# Patient Record
Sex: FEMALE | Race: BLACK OR AFRICAN AMERICAN | NOT HISPANIC OR LATINO | Employment: OTHER | URBAN - METROPOLITAN AREA
[De-identification: names, ages, dates, MRNs, and addresses within clinical notes are randomized per-mention and may not be internally consistent; named-entity substitution may affect disease eponyms.]

---
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
## 2022-04-11 ENCOUNTER — HISTORICAL (OUTPATIENT)
Dept: ADMINISTRATIVE | Facility: HOSPITAL | Age: 22
End: 2022-04-11

## 2022-04-28 VITALS — BODY MASS INDEX: 35.27 KG/M2 | HEIGHT: 69 IN | WEIGHT: 238.13 LBS

## 2022-04-30 NOTE — OP NOTE
Patient:   Magnus Caal             MRN: [id number]            FIN: [id number]               Age:   16 years     Sex:  Female     :  2000   Associated Diagnoses:   None   Author:   Suman Valera MD      OPERATIVE REPORT    DATE: 2017    SURGEON: Suman Valera MD    ASSISTANT: Alisson Dodge    PREOPERATIVE DIAGNOSIS:  1.  Right knee anterior cruciate ligament rupture    POSTOPERATIVE DIAGNOSIS:  Same    PROCEDURE:  1.  Right knee anterior cruciate ligament reconstruction with hamstring autograft (all inside single tendon)    TYPE OF ANESTHESIA:  General anesthesia with femoral nerve block    BLOOD LOSS:  Less than 20 cc    DVT PROPHYLAXIS:  This patient replaced on aspirin postoperatively for 10 days.    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  She sustained his ACL tear and wanted to proceed with surgery.    PROCEDURE IN DETAIL:      Diagnostic knee arthroscopy  The patient was carefully place in a supine position. The operative lower extremity was placed in the padded leg luis. The non-operative lower extremity was placed in padded leg rest. The operative site was prepped and draped in the normal sterile fashion. A time out was performed to confirm correct operative extremity, allergies, and antibiotic infusion.   The knee joint was infused with 60cc of isotonic fluid. The supero-medial inflow portal is established. The luan-medial and luan-lateral portals are established. All portal sites established with 11-blade.  Through the luan-lateral portal, we then sequentially visualized these areas of the knee for any pathology: medial comparment, postero-medial compartment, lateral compartment, postero-lateral compartment, intercondylar compartment, suprapatellar compartment, medial gutter, and lateral gutter.   The certified assistant provided  critical assistance by holding instruments including the arthroscope to provide adequate visualization of the procedure, as well as assisting in wound closure.  At the end of the procedure the knee was injected with 60cc of 0.50% Marcaine. The portals were closed with mastisol around the wound and placement of steri-strips. The patient tolerated the procedure well and taken to the recovery room in good condition.      ACL autograft reconstruction with Hamstring autograft (all-inside single hamstring tendon)  At the beginning of the case a 3cm incision was made just medial to the tibial tubercle.  After dissection down to the pes anserine, I could visualize the hamstring tendons.  I then isolated the hamstring tendon using blunt dissection and curved Albrecht scissors.  I sutured the ends of the tendons in order to provide traction.  I then carefully used the tendon stripper and pulled out the gracilis and/or the semitendinosus tendons.  These tendons were then placed on the back table and prepared. The all inside graft preparation was done. I marked the tendon at 20 mm on both ends. I also marked the distance on the suture to flip the buttons for cortical fixation. The graft was then wrapped in a wet lap.   The certified assistant prepared the graft while notchplasty and tunnel preparation proceeded. The joint was explored and the torn anterior cruciate ligament was visualized. The shaver was used to remove the stump of the ligament and strip the residual scar and ligament off the medial aspect of the lateral femoral condyle as well as the intercondylar eminence of the tibia. The notchplasty was carried out using the tamie, removing enough bone to visualized the most posterior portion of the medial aspect of the lateral and superior femoral condyle and insuring that there was no roof or wall impingment.  I then used the retrograde flip cutter for the femoral tunnel. Placed a guide in the footprint of the previous ACL  femoral attachment. I drilled with a guidewire to see if I liked the placement of the tunnel. I then drilled and with the flip cutter. I flipped the flip cutter and then retrograde drilled to the desired length.    I then placed a suture with a loop through this tunnel and then pulled through the medial portal.                     Using the ACL tibial guide, a flip cutter was placed through the proximal tibia at 55° angle to enter the joint in the mid-portion of the tibial attachment of the normal anterior cruciate ligament about 6-8mm anterior to the posterior cruciate ligament consistent with the mid-point of the anterior horn of the lateral meniscus. The flip cutter was flipped and a retro-drilled to the desired length. I then removed the flip cutter and placed a suture loop and pulled through the medial portal.  I then pulled the femoral and of the graft through the medial portal via the loop and into the femoral tunnel until we reached the markings on the suture and the button was flipped. I then cycled the knee to make sure I had good fixation of the button on the femur. I then pulled the graft to about 15 mm into the femoral tunnel. I then took the suture loop through the tibial tunnel and pulled the tibial and of the graft through the tibial tunnel. Once the button was flipped I then pulled the graft into the tibial tunnel at about 15 mm. I did this with the knee in full extension. I then tightened both ends, the femoral and tibial ends with pulling of the graft into both tunnels. I probed the graft and it was isometric and talked through full range of motion. There was no impingement noted and no severe restriction of knee flexion or extension.